# Patient Record
(demographics unavailable — no encounter records)

---

## 2024-11-16 NOTE — HISTORY OF PRESENT ILLNESS
[FreeTextEntry1] : This 14-month-old male is here for evaluation of an injury sustained to the left upper extremity after a fall from a slide.  The patient was seen at [facility] on 11/12/2024 and x-ray of the humerus elbow forearm and wrist were originally interpreted as showing no fracture but after reevaluation by the radiologist there appears to be a very small buckle fracture of the distal radius.  When I saw the child the small buckle fracture was not appreciated and because the child had very minimal discomfort and swelling I advised follow-up on a as needed basis.

## 2024-11-16 NOTE — HISTORY OF PRESENT ILLNESS
[FreeTextEntry1] : This 14-year-old male is here for evaluation of an injury sustained on 11/13/2024 to the left wrist secondary to a fall.  The patient was seen at [facility] emergency room and x-ray revealed a buckle fracture of the left distal radius.  The patient was placed into a splint and sent to this office for pediatric orthopedic consultation.

## 2024-11-16 NOTE — PHYSICAL EXAM
[FreeTextEntry1] : The splint was removed which was long-arm and examination of the shoulder and elbow revealed no obvious abnormalities.  The patient did have some swelling in the region of the wrist with some tenderness.

## 2024-11-16 NOTE — DATA REVIEWED
[de-identified] : Review of x-rays of the left humerus elbow forearm and wrist revealed a very tiny buckle fracture of the left distal radius.

## 2024-11-16 NOTE — DATA REVIEWED
[de-identified] : Review of x-ray of the left upper extremity including the humerus forearm wrist and fingers was interpreted by me to show no obvious abnormalities although this small buckle fracture was missed by me.

## 2024-11-16 NOTE — CONSULT LETTER
[Dear  ___] : Dear  [unfilled], [Consult Letter:] : I had the pleasure of evaluating your patient, [unfilled]. [Please see my note below.] : Please see my note below. [Consult Closing:] : Thank you very much for allowing me to participate in the care of this patient.  If you have any questions, please do not hesitate to contact me. [Sincerely,] : Sincerely, [FreeTextEntry3] : Dr Cantu

## 2024-11-16 NOTE — ASSESSMENT
[FreeTextEntry1] : Buckle fracture left distal radius  The family was advised to bring the child on a as needed basis.

## 2024-11-16 NOTE — ASSESSMENT
[FreeTextEntry1] : Torus fracture left distal radius  The patient will return in 3 weeks for x-ray reevaluation.  The splint will probably be discontinued at that time.

## 2024-11-16 NOTE — PHYSICAL EXAM
[FreeTextEntry1] : On physical examination it was a full range of motion of the left shoulder elbow wrist and fingers.  There was a mild degree of swelling in the region of the distal radius.  Child was using the upper extremity normally.  The neurovascular status of the left upper extremity was normal.

## 2024-12-05 NOTE — PHYSICAL EXAM
[FreeTextEntry1] : On physical examination there is a full range of motion of the left shoulder elbow wrist and fingers.  There is no tenderness at the fracture site of the distal radius.

## 2024-12-05 NOTE — ASSESSMENT
[FreeTextEntry1] : Torus fracture left distal radius  I alerted the family that they can treat the child normally at this time.  There is no restriction.  I explained to them that because the growth plate is not involved that there should be no residua from this fracture.  The child will return on a as needed basis.

## 2024-12-05 NOTE — HISTORY OF PRESENT ILLNESS
[FreeTextEntry1] : This 15-month-old male returns for reevaluation of a nondisplaced fracture of the left distal radius.  Patient is able to use the hand and wrist normally at this time.

## 2024-12-05 NOTE — DATA REVIEWED
[de-identified] : X-ray evaluation of the wrist on 12/5/2024 (AP, lateral and oblique views) reveals a healed torus fracture of the left distal radius.

## 2025-07-07 NOTE — ASSESSMENT
[FreeTextEntry1] : Impression: Nondisplaced fracture right distal radius.  I discussed options we will continue in a splint obviously restricted playground activities.  Can be removed for bathing purposes.  Child will return in approximately 2-1/2-3 weeks with x-rays of the right wrist

## 2025-07-07 NOTE — PHYSICAL EXAM
[FreeTextEntry1] : On exam today I have removed his splint he has mild swelling no clinical deformity compartments are soft neurovascular status is intact.  Review of x-rays nonetheless [facility] July 3, 2025 multiple views of the right wrist revealed a nondisplaced fracture of the distal radius

## 2025-07-07 NOTE — HISTORY OF PRESENT ILLNESS
[FreeTextEntry1] : This 22-month-old returns today for evaluation of his right wrist region.  He was well until July 3 when he tripped over a weight at home sustaining injury.  He was seen at [facility] where x-rays revealed a fracture..  He was sent out in a splint he is comfortable on today's visit

## 2025-07-23 NOTE — HISTORY OF PRESENT ILLNESS
[FreeTextEntry1] : This 22-month-old returns for follow-up of his right wrist fracture.  He has been comfortable using his splint mother has no complaints

## 2025-07-23 NOTE — PHYSICAL EXAM
[FreeTextEntry1] : On exam LS-spine he is moving the wrist well he has no significant tenderness.  X-rays ordered and taken today of the right wrist reveals satisfactory alignment healing of the distal radius fracture

## 2025-07-23 NOTE — ASSESSMENT
[FreeTextEntry1] : Impression: Fracture right distal radius.  The splint has been discontinued will return on a as needed basis